# Patient Record
Sex: MALE | Race: ASIAN | NOT HISPANIC OR LATINO
[De-identification: names, ages, dates, MRNs, and addresses within clinical notes are randomized per-mention and may not be internally consistent; named-entity substitution may affect disease eponyms.]

---

## 2018-12-11 ENCOUNTER — RX RENEWAL (OUTPATIENT)
Age: 83
End: 2018-12-11

## 2019-01-02 ENCOUNTER — RX RENEWAL (OUTPATIENT)
Age: 84
End: 2019-01-02

## 2019-01-18 ENCOUNTER — RX RENEWAL (OUTPATIENT)
Age: 84
End: 2019-01-18

## 2019-03-01 ENCOUNTER — RECORD ABSTRACTING (OUTPATIENT)
Age: 84
End: 2019-03-01

## 2019-03-01 DIAGNOSIS — Z87.718 PERSONAL HISTORY OF OTHER SPECIFIED (CORRECTED) CONGENITAL MALFORMATIONS OF GENITOURINARY SYSTEM: ICD-10-CM

## 2019-03-01 DIAGNOSIS — Z86.79 PERSONAL HISTORY OF OTHER DISEASES OF THE CIRCULATORY SYSTEM: ICD-10-CM

## 2019-03-01 DIAGNOSIS — Z86.010 PERSONAL HISTORY OF COLONIC POLYPS: ICD-10-CM

## 2019-03-01 DIAGNOSIS — K59.00 CONSTIPATION, UNSPECIFIED: ICD-10-CM

## 2019-03-01 DIAGNOSIS — Z87.438 PERSONAL HISTORY OF OTHER DISEASES OF MALE GENITAL ORGANS: ICD-10-CM

## 2019-03-01 DIAGNOSIS — M85.80 OTHER SPECIFIED DISORDERS OF BONE DENSITY AND STRUCTURE, UNSPECIFIED SITE: ICD-10-CM

## 2019-03-01 DIAGNOSIS — H54.40 BLINDNESS, ONE EYE, UNSPECIFIED EYE: ICD-10-CM

## 2019-03-01 DIAGNOSIS — K86.89 OTHER SPECIFIED DISEASES OF PANCREAS: ICD-10-CM

## 2019-03-01 DIAGNOSIS — R26.89 OTHER ABNORMALITIES OF GAIT AND MOBILITY: ICD-10-CM

## 2019-03-01 DIAGNOSIS — Z87.898 PERSONAL HISTORY OF OTHER SPECIFIED CONDITIONS: ICD-10-CM

## 2019-03-01 DIAGNOSIS — E03.9 HYPOTHYROIDISM, UNSPECIFIED: ICD-10-CM

## 2019-03-01 DIAGNOSIS — Z86.39 PERSONAL HISTORY OF OTHER ENDOCRINE, NUTRITIONAL AND METABOLIC DISEASE: ICD-10-CM

## 2019-03-01 DIAGNOSIS — R91.1 SOLITARY PULMONARY NODULE: ICD-10-CM

## 2019-03-01 DIAGNOSIS — R53.82 CHRONIC FATIGUE, UNSPECIFIED: ICD-10-CM

## 2019-03-01 DIAGNOSIS — K21.9 GASTRO-ESOPHAGEAL REFLUX DISEASE W/OUT ESOPHAGITIS: ICD-10-CM

## 2019-03-01 DIAGNOSIS — E23.7 DISORDER OF PITUITARY GLAND, UNSPECIFIED: ICD-10-CM

## 2019-04-04 ENCOUNTER — APPOINTMENT (OUTPATIENT)
Dept: CARDIOLOGY | Facility: CLINIC | Age: 84
End: 2019-04-04

## 2019-04-15 ENCOUNTER — RECORD ABSTRACTING (OUTPATIENT)
Age: 84
End: 2019-04-15

## 2019-04-15 DIAGNOSIS — Z78.9 OTHER SPECIFIED HEALTH STATUS: ICD-10-CM

## 2019-04-16 ENCOUNTER — NON-APPOINTMENT (OUTPATIENT)
Age: 84
End: 2019-04-16

## 2019-04-16 ENCOUNTER — APPOINTMENT (OUTPATIENT)
Dept: CARDIOLOGY | Facility: CLINIC | Age: 84
End: 2019-04-16
Payer: COMMERCIAL

## 2019-04-16 VITALS
BODY MASS INDEX: 21.03 KG/M2 | DIASTOLIC BLOOD PRESSURE: 80 MMHG | WEIGHT: 134 LBS | HEART RATE: 55 BPM | SYSTOLIC BLOOD PRESSURE: 154 MMHG | HEIGHT: 67 IN

## 2019-04-16 PROCEDURE — 93000 ELECTROCARDIOGRAM COMPLETE: CPT

## 2019-04-16 PROCEDURE — 99214 OFFICE O/P EST MOD 30 MIN: CPT

## 2019-04-16 RX ORDER — ALENDRONATE SODIUM 70 MG/1
70 TABLET ORAL
Refills: 0 | Status: DISCONTINUED | COMMUNITY
End: 2019-04-16

## 2019-04-16 RX ORDER — TAMSULOSIN HYDROCHLORIDE 0.4 MG/1
0.4 CAPSULE ORAL
Refills: 0 | Status: DISCONTINUED | COMMUNITY
End: 2019-04-16

## 2019-04-16 RX ORDER — MIRABEGRON 50 MG/1
50 TABLET, FILM COATED, EXTENDED RELEASE ORAL
Refills: 0 | Status: DISCONTINUED | COMMUNITY
End: 2019-04-16

## 2019-04-16 NOTE — PHYSICAL EXAM
[General Appearance - Well Developed] : well developed [Normal Appearance] : normal appearance [Well Groomed] : well groomed [General Appearance - Well Nourished] : well nourished [No Deformities] : no deformities [General Appearance - In No Acute Distress] : no acute distress [Normal Conjunctiva] : the conjunctiva exhibited no abnormalities [Eyelids - No Xanthelasma] : the eyelids demonstrated no xanthelasmas [No Oral Pallor] : no oral pallor [Normal Oral Mucosa] : normal oral mucosa [No Oral Cyanosis] : no oral cyanosis [Normal Jugular Venous A Waves Present] : normal jugular venous A waves present [Normal Jugular Venous V Waves Present] : normal jugular venous V waves present [No Jugular Venous Dias A Waves] : no jugular venous dias A waves [Exaggerated Use Of Accessory Muscles For Inspiration] : no accessory muscle use [Respiration, Rhythm And Depth] : normal respiratory rhythm and effort [Auscultation Breath Sounds / Voice Sounds] : lungs were clear to auscultation bilaterally [Heart Sounds] : normal S1 and S2 [Abdomen Soft] : soft [Abdomen Tenderness] : non-tender [Abdomen Mass (___ Cm)] : no abdominal mass palpated [Skin Color & Pigmentation] : normal skin color and pigmentation [] : no rash [No Venous Stasis] : no venous stasis [Skin Lesions] : no skin lesions [No Skin Ulcers] : no skin ulcer [No Xanthoma] : no  xanthoma was observed [Oriented To Time, Place, And Person] : oriented to person, place, and time [Affect] : the affect was normal [Mood] : the mood was normal [No Anxiety] : not feeling anxious [FreeTextEntry1] : trace edema

## 2019-04-16 NOTE — ADDENDUM
[FreeTextEntry1] : Instructions to staff:\par \par Send a copy of this report to the following provider(s):\par DR. Guerda Jimenez\par \par Schedule followup:\par 1 year\par \par Schedule testing:\par \par \par \par This report was generated using Dragon Dictation. Please excuse obvious typographical errors and contact this office for any questions. Any preliminary copy of this note given to the patient at the time of this visit has not been proofread or edited. This note is a part of a shared electronic record used by our physicians and may contain information generated by other physicians in this practice in addition to your visit with this office.

## 2019-04-16 NOTE — DISCUSSION/SUMMARY
[FreeTextEntry1] : Problem list/impression/recommendation.\par \par Hypertension.\par Overall I believe the patient's level of control is satisfactory.\par He is often overly concerned over his blood pressure readings. His home readings have been satisfactory.\par \par Dyslipidemia.\par Historically good control.\par I would like to review more recent laboratories when kindly provided.\par \par Valve/echo abnormalities.\par Echocardiogram 2018. Normal LV function. Mild diastolic dysfunction. Aortic sclerosis. Mild AI.\par \par Electrolyte abnormalities/hyponatremia\par The patient denies being on a diuretic. His medical regimen however is not confirmed.\par I recommend local medical and nephrology followup. He now lives in [place].

## 2019-04-16 NOTE — REASON FOR VISIT
[FreeTextEntry1] : Mr. ISAURA VALDEZ is a 89 year old male  presents for cardiovascular evaluation.\par \par His problem list includes:\par Hypertension, often labile.\par Dyslipidemia.\par Minor valve/echo findings.\par Hyperglycemia.

## 2019-04-16 NOTE — HISTORY OF PRESENT ILLNESS
[FreeTextEntry1] : This 89-year-old male patient presents for cardiovascular evaluation.\par \par His problem list is as noted above.\par \par Since his last examination he reports no major events or hospitalizations.\par \par He tries to stay active but notes occasional shortness of breath. He is unsteady and has some lightheadedness and vertigo. No acute symptoms of shortness of breath, chest discomfort, fainting.\par \par He continues to be very concerned with his blood pressure.\par \par He is moved to [place] and he now sees a local physician.\par \par Unfortunately the patient cannot confirm his medications.

## 2019-05-10 ENCOUNTER — APPOINTMENT (OUTPATIENT)
Dept: CARDIOLOGY | Facility: CLINIC | Age: 84
End: 2019-05-10

## 2019-05-13 ENCOUNTER — APPOINTMENT (OUTPATIENT)
Dept: ENDOCRINOLOGY | Facility: CLINIC | Age: 84
End: 2019-05-13

## 2019-05-17 ENCOUNTER — RX CHANGE (OUTPATIENT)
Age: 84
End: 2019-05-17

## 2019-05-28 ENCOUNTER — RX RENEWAL (OUTPATIENT)
Age: 84
End: 2019-05-28

## 2019-09-23 ENCOUNTER — RX RENEWAL (OUTPATIENT)
Age: 84
End: 2019-09-23

## 2019-11-01 ENCOUNTER — RX RENEWAL (OUTPATIENT)
Age: 84
End: 2019-11-01

## 2019-11-04 ENCOUNTER — APPOINTMENT (OUTPATIENT)
Dept: ENDOCRINOLOGY | Facility: CLINIC | Age: 84
End: 2019-11-04

## 2019-11-05 ENCOUNTER — RX RENEWAL (OUTPATIENT)
Age: 84
End: 2019-11-05

## 2020-01-21 ENCOUNTER — RX RENEWAL (OUTPATIENT)
Age: 85
End: 2020-01-21

## 2020-05-29 ENCOUNTER — APPOINTMENT (OUTPATIENT)
Dept: CARDIOLOGY | Facility: CLINIC | Age: 85
End: 2020-05-29
Payer: MEDICARE

## 2020-05-29 ENCOUNTER — NON-APPOINTMENT (OUTPATIENT)
Age: 85
End: 2020-05-29

## 2020-05-29 VITALS
SYSTOLIC BLOOD PRESSURE: 130 MMHG | DIASTOLIC BLOOD PRESSURE: 70 MMHG | HEART RATE: 56 BPM | BODY MASS INDEX: 21.03 KG/M2 | HEIGHT: 67 IN | WEIGHT: 134 LBS

## 2020-05-29 PROCEDURE — 99214 OFFICE O/P EST MOD 30 MIN: CPT

## 2020-05-29 PROCEDURE — 93000 ELECTROCARDIOGRAM COMPLETE: CPT

## 2020-05-29 RX ORDER — ELECTROLYTES/DEXTROSE
SOLUTION, ORAL ORAL
Refills: 0 | Status: DISCONTINUED | COMMUNITY
End: 2020-05-29

## 2020-05-29 RX ORDER — PSYLLIUM SEED (WITH DEXTROSE)
POWDER (GRAM) ORAL
Refills: 0 | Status: DISCONTINUED | COMMUNITY
End: 2020-05-29

## 2020-05-29 RX ORDER — CHOLECALCIFEROL (VITAMIN D3) 50 MCG
50 MCG TABLET ORAL
Refills: 0 | Status: DISCONTINUED | COMMUNITY
End: 2020-05-29

## 2020-05-29 RX ORDER — AMLODIPINE BESYLATE 5 MG/1
5 TABLET ORAL
Qty: 135 | Refills: 3 | Status: DISCONTINUED | COMMUNITY
Start: 2018-12-11 | End: 2020-05-29

## 2020-05-29 RX ORDER — ASPIRIN 81 MG/1
81 TABLET ORAL EVERY OTHER DAY
Refills: 0 | Status: DISCONTINUED | COMMUNITY
End: 2020-05-29

## 2020-05-29 RX ORDER — LEVOTHYROXINE SODIUM 0.09 MG/1
88 TABLET ORAL
Qty: 90 | Refills: 0 | Status: DISCONTINUED | COMMUNITY
End: 2020-05-29

## 2020-05-29 RX ORDER — FERROUS SULFATE 325(65) MG
325 (65 FE) TABLET ORAL TWICE DAILY
Refills: 0 | Status: DISCONTINUED | COMMUNITY
End: 2020-05-29

## 2020-05-29 RX ORDER — CALCIUM CARBONATE/VITAMIN D3 600 MG-20
600-800 TABLET ORAL
Refills: 0 | Status: DISCONTINUED | COMMUNITY
End: 2020-05-29

## 2020-05-29 NOTE — DISCUSSION/SUMMARY
[FreeTextEntry1] : Brief recommendations and follow-up: (see above for details)\par \par Noted above, I will make an adjustment of his antihypertensive regimen.\par Continue the doxazosin.\par We will hold the afternoon dose of amlodipine but follow his blood pressure.  If necessary, the afternoon dose may be given and we will consider resumption on a regular basis.\par The patient will follow-up with his primary care physician locally on [address].\par At the patient's request we will see him in 1 years time.

## 2020-05-29 NOTE — ASSESSMENT
[FreeTextEntry1] : EKG 5/29/2020.  Sinus bradycardia.  Heart rate 56.  Otherwise within normal limits.\par EKG sinus rhythm. 4/16/19. Within normal limits.

## 2020-05-29 NOTE — HISTORY OF PRESENT ILLNESS
[FreeTextEntry1] : This 89 year-old male patient presents for cardiovascular evaluation.\par \par His problem list is as noted above.\par \par The patient presents with his son Denis who assists with the history and translation.\par \par Since his last examination more than 1 year ago they report no major events or hospitalizations.  He is quite active walking regularly.  He does use a cane because of unsteadiness.  He has not seen his primary care physician in some time.  We did call for laboratories that were kindly provided from last year.\par \par There are no reported symptoms of shortness of breath, chest discomfort, palpitation, lightheadedness.  He has been having some urologic symptoms complaining again of 4 time nocturia which has been noted in the past.  He has seen his urologist who made some suggestions regarding his medications.\par \par

## 2020-05-29 NOTE — REASON FOR VISIT
[FreeTextEntry1] : Mr. ISAURA VALDEZ has the following problem list:\par \par Hypertension, often labile.\par Dyslipidemia.\par Minor valve/echo findings.\par Hyperglycemia.\par \par Primary care physician is Dr. Guerda Jimenez

## 2021-02-01 ENCOUNTER — RX RENEWAL (OUTPATIENT)
Age: 86
End: 2021-02-01

## 2021-05-25 ENCOUNTER — NON-APPOINTMENT (OUTPATIENT)
Age: 86
End: 2021-05-25

## 2021-05-25 ENCOUNTER — APPOINTMENT (OUTPATIENT)
Dept: CARDIOLOGY | Facility: CLINIC | Age: 86
End: 2021-05-25
Payer: MEDICARE

## 2021-05-25 VITALS — SYSTOLIC BLOOD PRESSURE: 114 MMHG | DIASTOLIC BLOOD PRESSURE: 50 MMHG | HEART RATE: 58 BPM | OXYGEN SATURATION: 98 %

## 2021-05-25 PROCEDURE — 99072 ADDL SUPL MATRL&STAF TM PHE: CPT

## 2021-05-25 PROCEDURE — 99213 OFFICE O/P EST LOW 20 MIN: CPT

## 2021-05-25 PROCEDURE — 93242 EXT ECG>48HR<7D RECORDING: CPT

## 2021-05-25 PROCEDURE — 93246 EXT ECG>7D<15D RECORDING: CPT

## 2021-05-25 RX ORDER — TIMOLOL MALEATE 2.5 MG/ML
0.25 SOLUTION/ DROPS OPHTHALMIC
Refills: 0 | Status: COMPLETED | COMMUNITY
End: 2021-05-25

## 2021-05-25 RX ORDER — DOXAZOSIN 2 MG/1
2 TABLET ORAL DAILY
Qty: 90 | Refills: 3 | Status: COMPLETED | COMMUNITY
Start: 2018-12-11 | End: 2021-05-25

## 2021-05-26 NOTE — REASON FOR VISIT
[Family Member] : family member [FreeTextEntry1] : Mr. Dixon presents for application of 14 day event monitor.  Accompanied by son Brady.\par \par Patient's son reports that patient had fallen twice in the past month, one week apart and required stitches on the back of his head. He was admitted for a few days in a local hospital in [place] where patient resides. They were told patient was noted to have bradycardia with PVCs and pauses on telemetry. Cardura and timolol eye drops were stopped. HR improved and patient was discharged home. \par \par Mr. Dixon denies chest pain, SOB, palpitations, or syncope. He reports lightheadedness at times.

## 2021-05-26 NOTE — PHYSICAL EXAM
[No Acute Distress] : no acute distress [Clear Lung Fields] : clear lung fields [Good Air Entry] : good air entry [No Respiratory Distress] : no respiratory distress  [No Rash] : no rash [Moves all extremities] : moves all extremities

## 2021-05-26 NOTE — HISTORY OF PRESENT ILLNESS
[FreeTextEntry1] : 89 year old male with hypertension, dyslipidemia, hypothyroidism, osteopenia, GERD, BPH, blindness of left eye, positional vertigo, polycystic kidney disease, pituitary adenoma.

## 2021-05-26 NOTE — REVIEW OF SYSTEMS
[Hearing Loss] : hearing loss [Dyspnea on exertion] : not dyspnea during exertion [Chest Discomfort] : no chest discomfort [Palpitations] : no palpitations [Dizziness] : dizziness [Memory Lapses Or Loss] : memory lapses or loss [FreeTextEntry3] : left eye blindness [FreeTextEntry4] : s/p cochlear implant

## 2021-05-27 ENCOUNTER — NON-APPOINTMENT (OUTPATIENT)
Age: 86
End: 2021-05-27

## 2021-05-27 RX ORDER — LOTEPREDNOL ETABONATE 3.8 MG/G
0.38 GEL OPHTHALMIC
Qty: 5 | Refills: 0 | Status: DISCONTINUED | COMMUNITY
Start: 2021-05-24

## 2021-05-27 RX ORDER — LOSARTAN POTASSIUM 25 MG/1
25 TABLET, FILM COATED ORAL
Qty: 30 | Refills: 0 | Status: DISCONTINUED | COMMUNITY
Start: 2021-05-23

## 2021-05-27 RX ORDER — AMLODIPINE BESYLATE 10 MG/1
10 TABLET ORAL
Qty: 30 | Refills: 0 | Status: DISCONTINUED | COMMUNITY
Start: 2021-05-23

## 2021-05-27 RX ORDER — PREDNISOLONE ACETATE 10 MG/ML
1 SUSPENSION/ DROPS OPHTHALMIC
Qty: 5 | Refills: 0 | Status: DISCONTINUED | COMMUNITY
Start: 2021-04-19

## 2021-06-09 ENCOUNTER — NON-APPOINTMENT (OUTPATIENT)
Age: 86
End: 2021-06-09

## 2021-06-10 ENCOUNTER — APPOINTMENT (OUTPATIENT)
Dept: CARDIOLOGY | Facility: CLINIC | Age: 86
End: 2021-06-10
Payer: MEDICARE

## 2021-06-10 ENCOUNTER — NON-APPOINTMENT (OUTPATIENT)
Age: 86
End: 2021-06-10

## 2021-06-10 VITALS — DIASTOLIC BLOOD PRESSURE: 78 MMHG | SYSTOLIC BLOOD PRESSURE: 180 MMHG

## 2021-06-10 VITALS
BODY MASS INDEX: 21.21 KG/M2 | TEMPERATURE: 98.6 F | DIASTOLIC BLOOD PRESSURE: 90 MMHG | HEIGHT: 66 IN | WEIGHT: 132 LBS | HEART RATE: 53 BPM | SYSTOLIC BLOOD PRESSURE: 190 MMHG

## 2021-06-10 DIAGNOSIS — E87.8 OTHER DISORDERS OF ELECTROLYTE AND FLUID BALANCE, NOT ELSEWHERE CLASSIFIED: ICD-10-CM

## 2021-06-10 PROCEDURE — 99072 ADDL SUPL MATRL&STAF TM PHE: CPT

## 2021-06-10 PROCEDURE — 99215 OFFICE O/P EST HI 40 MIN: CPT

## 2021-06-10 PROCEDURE — 93000 ELECTROCARDIOGRAM COMPLETE: CPT

## 2021-06-10 RX ORDER — LOTEPREDNOL ETABONATE 5 MG/ML
0.5 SUSPENSION/ DROPS OPHTHALMIC
Refills: 0 | Status: ACTIVE | COMMUNITY
Start: 2021-06-10

## 2021-06-10 NOTE — HISTORY OF PRESENT ILLNESS
[FreeTextEntry1] : This 89 year-old male patient presents for cardiovascular evaluation.\par \par His problem list is as noted above.\par \par The patient is seen as an expedited visit.  Available records are reviewed from his primary care physician.\par \par History is difficult because of his profound deafness.  The patient's son, Lucius, assists with the history.\par \par The patient presents after 3 separate episodes of falling.\par \par In May the patient had an initial episode of imbalance and a fall back striking his head.  He went to [facility] and had his laceration sutured.  He was not admitted to the hospital.  He then had a second episode where he fell back at home.  This time he did not need to seek medical attention.  A third time however he had another episode of falling and possible loss of consciousness and was admitted to [facility].  He had an extensive work-up.  The records are not yet available to me but will be requested.  Reportedly he saw a neurologist who raised the suspicion of Parkinson syndrome.  Reportedly the patient may have also seen a cardiologist but this is not confirmed.  There was evidence reportedly of a low pulse, perhaps in the 40 range.  A pacemaker was entertained but not implanted.\par \par Since that time the patient has had no further fainting episodes but does note some imbalance.  He also notes chronic vertigo.  As noted previously he has a longstanding inner ear issue and profound deafness.  He has a cochlear implantation.\par \par He is also seen his ophthalmologist who adjusted his medication to avoid the potential for beta-blockade bradycardia.  No acute symptoms of chest discomfort or shortness of breath.  No palpitation.\par \par The events appear to occur without warning.  Possible but unconfirmed true loss of consciousness.  He did awake spontaneously.  No use of CPR.\par \par The patient has seen Dr. Lee in [place].  He has not had follow-up closer to his home however.\par \par Patient did see our nurse practitioner where a 2-week Zio patch was placed.  Unfortunately results are not yet available despite multiple calls.\par \par \par \par \par

## 2021-06-10 NOTE — CARDIOLOGY SUMMARY
[de-identified] : Bharath-Ex 06/2011 normal. EF 72%.\par  [de-identified] :  Echo 10/21/18. Normal LV function. EF 72%. Mild diastolic dysfunction. Aortic sclerosis.\par     Gradient 12/4. Mild AI.\par  [de-identified] : Carotid duplex 8/13/14. No stenosis. No significant atherosclerosis. \par  [de-identified] : Pulmonary function test 5/5/14. Doctor Ernesto. Normal. \par

## 2021-06-10 NOTE — REVIEW OF SYSTEMS
[Negative] : Psychiatric [FreeTextEntry2] : Chronic fatigue.  Poor sleeping. [FreeTextEntry3] : Left eye blind.  Opacified cornea. [FreeTextEntry4] : Profound hearing loss.  Right cochlear implant.  Left hearing aid. [FreeTextEntry5] : See HPI. [FreeTextEntry7] : Chronic abdominal discomfort.  He has GERD.  Prone to constipation. [FreeTextEntry8] : Nocturia up to 4 times.  He has seen Dr. Us. [FreeTextEntry9] : Left total knee replacement.  Injection therapy on the right.  Uses a cane. [de-identified] : Imbalance. [de-identified] : Tendency toward easy bruising.

## 2021-06-10 NOTE — DISCUSSION/SUMMARY
[FreeTextEntry1] : Brief recommendations and follow-up: (see above for details)\par \par The patient has had 3 separate episodes of falling which is likely multifactorial.\par I cannot yet exclude a cardiac abnormality but I am suspicious that the findings are actually noncardiac.\par I have asked the patient's son, Lucius (phone #[phone]) to obtain the reports of his recent hospitalization.\par I await the 2-week Zio patch data.\par I will ask the patient's family to call me within the next 2 weeks and we will discuss the data.\par No appointment as of yet this will be determined depending on the results of the testing.\par Time spent 60 min.

## 2021-06-10 NOTE — ASSESSMENT
[FreeTextEntry1] : EKG 6/10/2021.  Sinus bradycardia.  Heart rate 53.  Otherwise within normal limits.\par EKG 5/29/2020.  Sinus bradycardia.  Heart rate 56.  Otherwise within normal limits.\par EKG sinus rhythm. 4/16/19. Within normal limits.

## 2021-06-10 NOTE — PHYSICAL EXAM
[Well Developed] : well developed [Well Nourished] : well nourished [No Acute Distress] : no acute distress [Normal Conjunctiva] : normal conjunctiva [Normal Venous Pressure] : normal venous pressure [No Carotid Bruit] : no carotid bruit [Normal S1, S2] : normal S1, S2 [No Rub] : no rub [No Gallop] : no gallop [Clear Lung Fields] : clear lung fields [Good Air Entry] : good air entry [No Respiratory Distress] : no respiratory distress  [Soft] : abdomen soft [Non Tender] : non-tender [No Masses/organomegaly] : no masses/organomegaly [Normal Bowel Sounds] : normal bowel sounds [No Cyanosis] : no cyanosis [No Clubbing] : no clubbing [No Varicosities] : no varicosities [No Rash] : no rash [No Skin Lesions] : no skin lesions [Moves all extremities] : moves all extremities [No Focal Deficits] : no focal deficits [Normal Speech] : normal speech [Alert and Oriented] : alert and oriented [Normal memory] : normal memory [de-identified] : Left cornea opacity [de-identified] : 6 systolic murmur left sternal border [de-identified] : He uses a cane or a walker. [de-identified] : Trace edema.

## 2021-06-10 NOTE — REASON FOR VISIT
[FreeTextEntry1] : Mr. ISAURA VALDEZ has the following problem list:\par \par Hypertension, often labile.\par Dyslipidemia.\par Valve/echo abnormalities.\par Hyperglycemia.\par \par He has additional medical problems as noted.\par Most significantly this includes profound hearing loss, visual abnormalities, episodes of falling/near syncope\par \par Primary care physician is Dr. Guerda Jimenez and Dr. Lee.

## 2021-06-15 ENCOUNTER — NON-APPOINTMENT (OUTPATIENT)
Age: 86
End: 2021-06-15

## 2021-07-27 ENCOUNTER — NON-APPOINTMENT (OUTPATIENT)
Age: 86
End: 2021-07-27

## 2022-01-10 ENCOUNTER — RX RENEWAL (OUTPATIENT)
Age: 87
End: 2022-01-10

## 2022-09-11 ENCOUNTER — NON-APPOINTMENT (OUTPATIENT)
Age: 87
End: 2022-09-11

## 2022-09-12 ENCOUNTER — NON-APPOINTMENT (OUTPATIENT)
Age: 87
End: 2022-09-12

## 2022-09-12 ENCOUNTER — APPOINTMENT (OUTPATIENT)
Dept: CARDIOLOGY | Facility: CLINIC | Age: 87
End: 2022-09-12

## 2022-09-12 VITALS
BODY MASS INDEX: 21.38 KG/M2 | HEART RATE: 60 BPM | SYSTOLIC BLOOD PRESSURE: 162 MMHG | DIASTOLIC BLOOD PRESSURE: 68 MMHG | RESPIRATION RATE: 16 BRPM | HEIGHT: 66 IN | OXYGEN SATURATION: 98 % | TEMPERATURE: 97.6 F | WEIGHT: 133 LBS

## 2022-09-12 DIAGNOSIS — R55 SYNCOPE AND COLLAPSE: ICD-10-CM

## 2022-09-12 DIAGNOSIS — I38 ENDOCARDITIS, VALVE UNSPECIFIED: ICD-10-CM

## 2022-09-12 DIAGNOSIS — Z92.89 PERSONAL HISTORY OF OTHER MEDICAL TREATMENT: ICD-10-CM

## 2022-09-12 DIAGNOSIS — I49.9 CARDIAC ARRHYTHMIA, UNSPECIFIED: ICD-10-CM

## 2022-09-12 DIAGNOSIS — Z01.89 ENCOUNTER FOR OTHER SPECIFIED SPECIAL EXAMINATIONS: ICD-10-CM

## 2022-09-12 PROCEDURE — 93000 ELECTROCARDIOGRAM COMPLETE: CPT

## 2022-09-12 PROCEDURE — 99214 OFFICE O/P EST MOD 30 MIN: CPT | Mod: 25

## 2022-09-12 NOTE — ASSESSMENT
[FreeTextEntry1] : EKG 9/12/2022.  Sinus bradycardia.  Heart rate 56.  \par EKG 6/10/2021.  Sinus bradycardia.  Heart rate 53.  Otherwise within normal limits.\par EKG 5/29/2020.  Sinus bradycardia.  Heart rate 56.  Otherwise within normal limits.\par EKG sinus rhythm. 4/16/19. Within normal limits.

## 2022-09-12 NOTE — REASON FOR VISIT
[FreeTextEntry1] : Mr. ISAURA VALDEZ has the following problem list:\par \par Hypertension, often labile.\par Dyslipidemia.\par Valve/echo abnormalities.\par Hyperglycemia.\par \par He has additional medical problems as noted.\par Most significantly this includes profound hearing loss, visual abnormalities, episodes of falling/near syncope\par \par Primary care physician is Dr. Guerda Jimenez and Dr. Lee.\par \par The patient is accompanied by his son, Lucius who assists with the history.

## 2022-09-12 NOTE — DISCUSSION/SUMMARY
[FreeTextEntry1] : Brief recommendations and follow-up: (see above for details)\par \par The patient's overall cardiovascular status is stable.\par He does have periods of labile hypertension.  These are clinically acceptable and I advised the patient and his son that we will "accept" these occasional elevations of blood pressure.  Overall he is in good control.\par I will look forward to the laboratories that are anticipated.\par He has a chronic sinus bradycardia, insignificant.\par He has had no further episodes of fainting.  His imbalance is multifactorial.\par Next routine visit 1 year.

## 2022-09-12 NOTE — PHYSICAL EXAM
[de-identified] : Left cornea opacity [de-identified] : 6 systolic murmur left sternal border [de-identified] : He uses a cane or a walker. [de-identified] : Trace edema.

## 2022-09-12 NOTE — HISTORY OF PRESENT ILLNESS
[FreeTextEntry1] : \par This 89 year-old male patient presents for cardiovascular evaluation.\par \par His problem list is as noted above.\par \par The patient's son assists with the history.  He reports that 1 month ago the patient lost his wife.  I was sorry to hear this.  They were  75 years.\par \par There have been no major events or hospitalizations or serious illnesses.  The patient remains with chronic imbalance, chronic visual impairment, and chronic dyspnea.  No symptoms of chest discomfort.  Chronic lightheadedness but no fainting.\par \par The patient has been to a neurologist.\par \par The patient is anticipating local follow-up with his primary care physician.  He also has a local follow-up primary care physician on [place].  Laboratories are anticipated tomorrow.\par

## 2022-09-12 NOTE — REVIEW OF SYSTEMS
[FreeTextEntry2] : Chronic fatigue.  Poor sleeping. [FreeTextEntry3] : Left eye blind.  Opacified cornea. [FreeTextEntry4] : Profound hearing loss.  Right cochlear implant.  Left hearing aid. [FreeTextEntry5] : See HPI. [FreeTextEntry7] : Chronic abdominal discomfort.  He has GERD.  Prone to constipation. [FreeTextEntry8] : Nocturia up to 4 times.  He has seen Dr. Us. [FreeTextEntry9] : Left total knee replacement.  Injection therapy on the right.  Uses a cane. [de-identified] : Imbalance. [de-identified] : Tendency toward easy bruising.

## 2022-09-12 NOTE — CARDIOLOGY SUMMARY
[de-identified] : 2-week Zio patch completed 6/1/2021.  Sinus rhythm to sinus bradycardia.  Occasional APC with episodic symptom correlation.  A total of 26 short, slow atrial runs, longest 13 beats, fastest 144 bpm, asymptomatic.  Rare VPC.  No sustained or symptomatic arrhythmias. [de-identified] : Bharath-Ex 06/2011 normal. EF 72%.\par  [de-identified] : Echo 10/21/18. Normal LV function. EF 72%. Mild diastolic dysfunction. Aortic sclerosis. Gradient 12/4. Mild AI.\par  [de-identified] : Carotid duplex 8/13/14. No stenosis. No significant atherosclerosis. \par  [de-identified] : Pulmonary function test 5/5/14. Doctor Ernesto. Normal. \par MRI brain. "Mild enlargement of anterior pituitary gland, microadenoma versus glandular hyperplasia". \par CT scan high resolution 7/15/15. Nodules as described without significant change. Cardiomegaly. \par \par

## 2022-12-27 ENCOUNTER — RX RENEWAL (OUTPATIENT)
Age: 87
End: 2022-12-27

## 2023-10-16 ENCOUNTER — RX RENEWAL (OUTPATIENT)
Age: 88
End: 2023-10-16

## 2023-10-26 PROBLEM — I48.91 AFIB: Status: ACTIVE | Noted: 2023-10-26

## 2023-10-27 ENCOUNTER — NON-APPOINTMENT (OUTPATIENT)
Age: 88
End: 2023-10-27

## 2023-10-27 ENCOUNTER — APPOINTMENT (OUTPATIENT)
Dept: ELECTROPHYSIOLOGY | Facility: CLINIC | Age: 88
End: 2023-10-27
Payer: MEDICARE

## 2023-10-27 VITALS
SYSTOLIC BLOOD PRESSURE: 171 MMHG | WEIGHT: 133 LBS | HEART RATE: 54 BPM | HEIGHT: 66 IN | TEMPERATURE: 97 F | DIASTOLIC BLOOD PRESSURE: 74 MMHG | OXYGEN SATURATION: 99 % | BODY MASS INDEX: 21.38 KG/M2

## 2023-10-27 DIAGNOSIS — R06.00 DYSPNEA, UNSPECIFIED: ICD-10-CM

## 2023-10-27 DIAGNOSIS — R42 DIZZINESS AND GIDDINESS: ICD-10-CM

## 2023-10-27 DIAGNOSIS — I47.29 OTHER VENTRICULAR TACHYCARDIA: ICD-10-CM

## 2023-10-27 DIAGNOSIS — I45.5 OTHER SPECIFIED HEART BLOCK: ICD-10-CM

## 2023-10-27 DIAGNOSIS — I48.91 UNSPECIFIED ATRIAL FIBRILLATION: ICD-10-CM

## 2023-10-27 PROCEDURE — 99203 OFFICE O/P NEW LOW 30 MIN: CPT | Mod: 25

## 2023-10-27 PROCEDURE — 93000 ELECTROCARDIOGRAM COMPLETE: CPT

## 2023-10-27 RX ORDER — LEVOTHYROXINE SODIUM 88 UG/1
88 TABLET ORAL DAILY
Refills: 0 | Status: ACTIVE | COMMUNITY

## 2023-10-27 RX ORDER — AMLODIPINE BESYLATE 5 MG/1
5 TABLET ORAL TWICE DAILY
Qty: 180 | Refills: 3 | Status: ACTIVE | COMMUNITY

## 2023-10-27 RX ORDER — VIT C/E/ZN/COPPR/LUTEIN/ZEAXAN 250MG-90MG
CAPSULE ORAL
Refills: 0 | Status: ACTIVE | COMMUNITY
Start: 2021-06-10

## 2023-10-27 RX ORDER — TAFLUPROST 0.01 MG/ML
0 SOLUTION/ DROPS OPHTHALMIC
Refills: 0 | Status: ACTIVE | COMMUNITY
Start: 2021-05-27

## 2023-10-27 RX ORDER — ASPIRIN 81 MG/1
81 TABLET, DELAYED RELEASE ORAL
Refills: 0 | Status: DISCONTINUED | COMMUNITY
End: 2023-10-27

## 2023-10-27 RX ORDER — LOSARTAN POTASSIUM 100 MG/1
100 TABLET, FILM COATED ORAL DAILY
Qty: 90 | Refills: 1 | Status: ACTIVE | COMMUNITY
Start: 2018-12-11

## 2023-10-27 RX ORDER — FERROUS SULFATE 325(65) MG
325 (65 FE) TABLET ORAL DAILY
Refills: 0 | Status: ACTIVE | COMMUNITY
Start: 2022-09-12

## 2023-11-08 ENCOUNTER — NON-APPOINTMENT (OUTPATIENT)
Age: 88
End: 2023-11-08

## 2023-12-04 ENCOUNTER — NON-APPOINTMENT (OUTPATIENT)
Age: 88
End: 2023-12-04

## 2023-12-08 ENCOUNTER — NON-APPOINTMENT (OUTPATIENT)
Age: 88
End: 2023-12-08

## 2023-12-08 ENCOUNTER — APPOINTMENT (OUTPATIENT)
Dept: ELECTROPHYSIOLOGY | Facility: CLINIC | Age: 88
End: 2023-12-08
Payer: MEDICARE

## 2023-12-08 VITALS
HEIGHT: 66 IN | WEIGHT: 133 LBS | TEMPERATURE: 97 F | BODY MASS INDEX: 21.38 KG/M2 | DIASTOLIC BLOOD PRESSURE: 76 MMHG | HEART RATE: 56 BPM | OXYGEN SATURATION: 99 % | SYSTOLIC BLOOD PRESSURE: 205 MMHG

## 2023-12-08 DIAGNOSIS — R00.1 BRADYCARDIA, UNSPECIFIED: ICD-10-CM

## 2023-12-08 DIAGNOSIS — C91.10 CHRONIC LYMPHOCYTIC LEUKEMIA OF B-CELL TYPE NOT HAVING ACHIEVED REMISSION: ICD-10-CM

## 2023-12-08 DIAGNOSIS — I10 ESSENTIAL (PRIMARY) HYPERTENSION: ICD-10-CM

## 2023-12-08 DIAGNOSIS — E78.5 HYPERLIPIDEMIA, UNSPECIFIED: ICD-10-CM

## 2023-12-08 PROCEDURE — 93000 ELECTROCARDIOGRAM COMPLETE: CPT

## 2023-12-08 PROCEDURE — 99214 OFFICE O/P EST MOD 30 MIN: CPT | Mod: 25

## 2023-12-08 RX ORDER — SERTRALINE 25 MG/1
25 TABLET, FILM COATED ORAL DAILY
Refills: 0 | Status: ACTIVE | COMMUNITY
Start: 2023-12-08

## 2023-12-11 ENCOUNTER — NON-APPOINTMENT (OUTPATIENT)
Age: 88
End: 2023-12-11

## 2024-03-14 NOTE — DISCUSSION/SUMMARY
[FreeTextEntry1] : IMPRESSIONS:  Symptomatic bradycardia: EKG performed today to assess for conduction disease reveals sinus bradycardia at 55 bpm. Echo with EF 55%. Has frequent dizziness. 4-day Cardiac monitor showed sinus rhythm with PACs with rates ranging 39 bpm - 108bpm with longest pause 3.7. secs (at 0703 am) AF burden 2%, NSVT 5 beats, PSVT 7 beats. Hesitant to start Eliquis 2.5 mg bid as patient has history of falls and is a high fall risk, was on ASA has now stopped.  Not started BB due to bradycardia. Neuro evaluation pending for persistent dizziness. Accompanied by his son with whom we reviewed 30-day monitor, which showed sinus bradycardia into the 30s and apc's, symptoms correlating to sinus bradycardia in the 40s as well as presence of APCs. Avg HR 58bpm. HR ranged 34-127bpm. Will discuss with patient's niece Dr Kemi Davidson # [phone]. Would lean towards pacemaker given evidence of symptomatic bradycardia (dizziness and shortness of breath) during recordings and heart rates into the 30s-50s during light to moderate activity.   HTN: resume oral antihypertensives as prescribed. Encouraged heart healthy diet, sodium restriction, and weight loss. Continue regular f/u with Cardiologist for further HTN management.  HLD: resume statin therapy as prescribed and regular f/u with Cardiologist for routine lipid monitoring and management.   Discuss with patient's niece (critical care [facility], son's wife (anesthesiologist)

## 2024-03-14 NOTE — HISTORY OF PRESENT ILLNESS
[FreeTextEntry1] : Mr. ISAURA VALDEZ is a 89-year-old man with past medical history of HTN, hypothyroidism who is here for follow-up visit for pacemaker evaluation. Patient c/o dizziness and dyspnea. Saw cardiologist. 4-day Cardiac monitor showed sinus rhythm with PACs ranging 39 bpm - 108bpm with longest pause 3.7. secs (at 0703 am) AF burden 2%, NSVT 5 beats, PSVT 7 beats. Hesitant to start Eliquis 2.5 mg bid as patient has history of falls and is a high fall risk; was on ASA but has now stopped.  Not started BB due to bradycardia. Neuro evaluation pending for persistent dizziness. Recent echo showed LVEF 55%. Accompanied by his son to review results of monitor.  Also, daughter in law, a critical care nurse in the critical care units at [facility], is interested in the results of the monitor.  Denies chest pain, palpitations, shortness of breath, syncope.

## 2024-03-14 NOTE — PHYSICAL EXAM
[Well Developed] : well developed [Well Nourished] : well nourished [No Acute Distress] : no acute distress [Normal Conjunctiva] : normal conjunctiva [Normal Venous Pressure] : normal venous pressure [No Carotid Bruit] : no carotid bruit [Normal] : normal S1, S2, no murmur, no rub, no gallop [Normal S1, S2] : normal S1, S2 [No Murmur] : no murmur [No Rub] : no rub [No Gallop] : no gallop [Clear Lung Fields] : clear lung fields [Good Air Entry] : good air entry [No Respiratory Distress] : no respiratory distress  [Soft] : abdomen soft [Non Tender] : non-tender [No Masses/organomegaly] : no masses/organomegaly [Normal Gait] : normal gait [Normal Bowel Sounds] : normal bowel sounds [No Edema] : no edema [No Cyanosis] : no cyanosis [No Clubbing] : no clubbing [No Varicosities] : no varicosities [No Skin Lesions] : no skin lesions [No Rash] : no rash [Moves all extremities] : moves all extremities [No Focal Deficits] : no focal deficits [Alert and Oriented] : alert and oriented [Normal Speech] : normal speech [Normal memory] : normal memory

## 2024-03-15 ENCOUNTER — APPOINTMENT (OUTPATIENT)
Dept: ELECTROPHYSIOLOGY | Facility: CLINIC | Age: 89
End: 2024-03-15
Payer: MEDICARE

## 2024-03-15 ENCOUNTER — APPOINTMENT (OUTPATIENT)
Dept: ELECTROPHYSIOLOGY | Facility: CLINIC | Age: 89
End: 2024-03-15

## 2025-01-15 ENCOUNTER — APPOINTMENT (OUTPATIENT)
Age: 89
End: 2025-01-15
Payer: MEDICARE

## 2025-01-15 VITALS — BODY MASS INDEX: 23.3 KG/M2 | HEIGHT: 66 IN | WEIGHT: 145 LBS

## 2025-01-15 DIAGNOSIS — L85.3 XEROSIS CUTIS: ICD-10-CM

## 2025-01-15 DIAGNOSIS — B35.1 TINEA UNGUIUM: ICD-10-CM

## 2025-01-15 DIAGNOSIS — M79.675 PAIN IN LEFT TOE(S): ICD-10-CM

## 2025-01-15 DIAGNOSIS — I73.9 PERIPHERAL VASCULAR DISEASE, UNSPECIFIED: ICD-10-CM

## 2025-01-15 DIAGNOSIS — M79.674 PAIN IN RIGHT TOE(S): ICD-10-CM

## 2025-01-15 PROCEDURE — 11721 DEBRIDE NAIL 6 OR MORE: CPT

## 2025-01-15 PROCEDURE — 99203 OFFICE O/P NEW LOW 30 MIN: CPT | Mod: 25

## 2025-01-16 ENCOUNTER — NON-APPOINTMENT (OUTPATIENT)
Age: 89
End: 2025-01-16

## 2025-01-16 DIAGNOSIS — Z87.39 PERSONAL HISTORY OF OTHER DISEASES OF THE MUSCULOSKELETAL SYSTEM AND CONNECTIVE TISSUE: ICD-10-CM

## 2025-06-23 ENCOUNTER — APPOINTMENT (OUTPATIENT)
Age: 89
End: 2025-06-23
Payer: MEDICARE

## 2025-06-23 ENCOUNTER — NON-APPOINTMENT (OUTPATIENT)
Age: 89
End: 2025-06-23

## 2025-06-23 VITALS — BODY MASS INDEX: 23.3 KG/M2 | WEIGHT: 145 LBS | HEIGHT: 66 IN

## 2025-06-23 PROCEDURE — 11721 DEBRIDE NAIL 6 OR MORE: CPT

## 2025-06-23 PROCEDURE — 99213 OFFICE O/P EST LOW 20 MIN: CPT | Mod: 25

## 2025-08-16 ENCOUNTER — NON-APPOINTMENT (OUTPATIENT)
Age: 89
End: 2025-08-16

## 2025-09-02 ENCOUNTER — APPOINTMENT (OUTPATIENT)
Age: 89
End: 2025-09-02